# Patient Record
Sex: MALE | Race: BLACK OR AFRICAN AMERICAN | Employment: STUDENT | URBAN - METROPOLITAN AREA
[De-identification: names, ages, dates, MRNs, and addresses within clinical notes are randomized per-mention and may not be internally consistent; named-entity substitution may affect disease eponyms.]

---
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
## 2021-01-14 ENCOUNTER — LAB SERVICES (OUTPATIENT)
Dept: OCCUPATIONAL MEDICINE | Age: 21
End: 2021-01-14

## 2021-01-14 DIAGNOSIS — Z20.822 EXPOSURE TO COVID-19 VIRUS: Primary | ICD-10-CM

## 2021-01-14 PROCEDURE — U0005 INFEC AGEN DETEC AMPLI PROBE: HCPCS | Performed by: HOSPITALIST

## 2021-01-14 PROCEDURE — U0003 INFECTIOUS AGENT DETECTION BY NUCLEIC ACID (DNA OR RNA); SEVERE ACUTE RESPIRATORY SYNDROME CORONAVIRUS 2 (SARS-COV-2) (CORONAVIRUS DISEASE [COVID-19]), AMPLIFIED PROBE TECHNIQUE, MAKING USE OF HIGH THROUGHPUT TECHNOLOGIES AS DESCRIBED BY CMS-2020-01-R: HCPCS | Performed by: HOSPITALIST

## 2021-01-15 LAB
SARS-COV-2 RNA RESP QL NAA+PROBE: NOT DETECTED
SERVICE CMNT-IMP: NORMAL
SERVICE CMNT-IMP: NORMAL

## 2021-03-03 ENCOUNTER — HOSPITAL ENCOUNTER (OUTPATIENT)
Age: 21
Discharge: HOME | End: 2021-03-03
Payer: COMMERCIAL

## 2021-03-03 VITALS
HEART RATE: 60 BPM | SYSTOLIC BLOOD PRESSURE: 125 MMHG | DIASTOLIC BLOOD PRESSURE: 70 MMHG | OXYGEN SATURATION: 100 % | TEMPERATURE: 98 F | RESPIRATION RATE: 18 BRPM

## 2021-03-03 DIAGNOSIS — H57.89 EYE IRRITATION: Primary | ICD-10-CM

## 2021-03-03 PROCEDURE — 99203 OFFICE O/P NEW LOW 30 MIN: CPT | Performed by: NURSE PRACTITIONER

## 2021-03-04 NOTE — ED PROVIDER NOTES
Patient Seen in: [facility]      History   Patient presents with:  Redness    Stated Complaint: Jerk sauce in R eye    HPI/Subjective:   HPI    This is a well-appearing 26-year-old who presents with a chief complaint of right eye irritation. Mouth/Throat:      Mouth: Mucous membranes are moist.      Pharynx: Oropharynx is clear. Uvula midline. Eyes:      General: Lids are normal. Lids are everted, no foreign bodies appreciated. Extraocular Movements: Extraocular movements intact. [id number]  [phone]              Medications Prescribed:  There are no discharge medications for this patient.

## 2021-03-04 NOTE — ED INITIAL ASSESSMENT (HPI)
Pt states getting jerk sauce in his eye about an hour ago. Pt states flushed it as much as he could.

## 2021-03-21 ENCOUNTER — HOSPITAL ENCOUNTER (OUTPATIENT)
Age: 21
Discharge: HOME | End: 2021-03-21
Payer: COMMERCIAL

## 2021-03-21 VITALS
HEART RATE: 50 BPM | DIASTOLIC BLOOD PRESSURE: 67 MMHG | OXYGEN SATURATION: 100 % | SYSTOLIC BLOOD PRESSURE: 130 MMHG | TEMPERATURE: 98 F | RESPIRATION RATE: 20 BRPM

## 2021-03-21 DIAGNOSIS — Z20.822 EXPOSURE TO COVID-19 VIRUS: Primary | ICD-10-CM

## 2021-03-21 DIAGNOSIS — Z20.822 LAB TEST NEGATIVE FOR COVID-19 VIRUS: ICD-10-CM

## 2021-03-21 LAB — SARS-COV-2 RNA RESP QL NAA+PROBE: NOT DETECTED

## 2021-03-21 PROCEDURE — U0002 COVID-19 LAB TEST NON-CDC: HCPCS | Performed by: NURSE PRACTITIONER

## 2021-03-21 PROCEDURE — 99212 OFFICE O/P EST SF 10 MIN: CPT | Performed by: NURSE PRACTITIONER

## 2021-03-21 NOTE — ED PROVIDER NOTES
Patient presents with:  Testing      HPI:     Jena Severino. is a 21year old male who presents for a Covid test.  His grandfather tested positive yesterday. He was in contact with him for the past couple days.   He denies any symptoms or co Transportation (Medical):       Lack of Transportation (Non-Medical):   Physical Activity:       Days of Exercise per Week:       Minutes of Exercise per Session:   Stress:       Feeling of Stress :   Social Connections:       Frequency of Communication wi with his primary care doctor. Diagnosis:    ICD-10-CM    1.  Exposure to COVID-19 virus  Z20.822 RAPID SARS-COV-2 BY PCR     RAPID SARS-COV-2 BY PCR   2. Lab test negative for COVID-19 virus  Z03.818        All results reviewed and discussed with patient

## 2021-03-25 ENCOUNTER — LAB SERVICES (OUTPATIENT)
Dept: OCCUPATIONAL MEDICINE | Age: 21
End: 2021-03-25

## 2021-03-25 DIAGNOSIS — Z20.822 SUSPECTED COVID-19 VIRUS INFECTION: Primary | ICD-10-CM

## 2021-03-25 PROCEDURE — U0005 INFEC AGEN DETEC AMPLI PROBE: HCPCS | Performed by: HOSPITALIST

## 2021-03-25 PROCEDURE — U0003 INFECTIOUS AGENT DETECTION BY NUCLEIC ACID (DNA OR RNA); SEVERE ACUTE RESPIRATORY SYNDROME CORONAVIRUS 2 (SARS-COV-2) (CORONAVIRUS DISEASE [COVID-19]), AMPLIFIED PROBE TECHNIQUE, MAKING USE OF HIGH THROUGHPUT TECHNOLOGIES AS DESCRIBED BY CMS-2020-01-R: HCPCS | Performed by: HOSPITALIST

## 2021-03-26 LAB
SARS-COV-2 RNA RESP QL NAA+PROBE: NOT DETECTED
SERVICE CMNT-IMP: NORMAL
SERVICE CMNT-IMP: NORMAL

## 2021-11-29 ENCOUNTER — HOSPITAL ENCOUNTER (EMERGENCY)
Facility: HOSPITAL | Age: 21
Discharge: HOME | End: 2021-11-30
Attending: EMERGENCY MEDICINE
Payer: COMMERCIAL

## 2021-11-29 ENCOUNTER — HOSPITAL ENCOUNTER (OUTPATIENT)
Age: 21
Discharge: ANOTHER HOSPITAL | End: 2021-11-29
Payer: COMMERCIAL

## 2021-11-29 VITALS
HEART RATE: 85 BPM | OXYGEN SATURATION: 100 % | TEMPERATURE: 102 F | SYSTOLIC BLOOD PRESSURE: 118 MMHG | DIASTOLIC BLOOD PRESSURE: 75 MMHG | RESPIRATION RATE: 18 BRPM

## 2021-11-29 DIAGNOSIS — Z11.52 ENCOUNTER FOR SCREENING FOR COVID-19: Primary | ICD-10-CM

## 2021-11-29 DIAGNOSIS — B34.9 VIRAL SYNDROME: Primary | ICD-10-CM

## 2021-11-29 DIAGNOSIS — R50.9 FEVER, UNSPECIFIED FEVER CAUSE: ICD-10-CM

## 2021-11-29 DIAGNOSIS — R51.9 INTRACTABLE HEADACHE, UNSPECIFIED CHRONICITY PATTERN, UNSPECIFIED HEADACHE TYPE: ICD-10-CM

## 2021-11-29 PROCEDURE — 87880 STREP A ASSAY W/OPTIC: CPT | Performed by: NURSE PRACTITIONER

## 2021-11-29 PROCEDURE — 99284 EMERGENCY DEPT VISIT MOD MDM: CPT

## 2021-11-29 PROCEDURE — 85025 COMPLETE CBC W/AUTO DIFF WBC: CPT | Performed by: EMERGENCY MEDICINE

## 2021-11-29 PROCEDURE — 87502 INFLUENZA DNA AMP PROBE: CPT | Performed by: NURSE PRACTITIONER

## 2021-11-29 PROCEDURE — 99215 OFFICE O/P EST HI 40 MIN: CPT | Performed by: NURSE PRACTITIONER

## 2021-11-29 PROCEDURE — A9150 MISC/EXPER NON-PRESCRIPT DRU: HCPCS | Performed by: NURSE PRACTITIONER

## 2021-11-29 PROCEDURE — 80048 BASIC METABOLIC PNL TOTAL CA: CPT | Performed by: EMERGENCY MEDICINE

## 2021-11-29 PROCEDURE — 96360 HYDRATION IV INFUSION INIT: CPT

## 2021-11-29 PROCEDURE — U0002 COVID-19 LAB TEST NON-CDC: HCPCS | Performed by: NURSE PRACTITIONER

## 2021-11-29 RX ORDER — ACETAMINOPHEN 500 MG
1000 TABLET ORAL ONCE
Status: COMPLETED | OUTPATIENT
Start: 2021-11-29 | End: 2021-11-29

## 2021-11-30 VITALS
HEART RATE: 66 BPM | DIASTOLIC BLOOD PRESSURE: 67 MMHG | BODY MASS INDEX: 19.7 KG/M2 | RESPIRATION RATE: 16 BRPM | OXYGEN SATURATION: 100 % | TEMPERATURE: 100 F | WEIGHT: 130 LBS | SYSTOLIC BLOOD PRESSURE: 120 MMHG | HEIGHT: 68 IN

## 2021-11-30 NOTE — ED QUICK NOTES
Per provider recommendation pt will go to [facility] ED for further evaluation of symptoms,leaving IC stable no acute distress noted.

## 2021-11-30 NOTE — ED PROVIDER NOTES
Patient Seen in: [facility] Emergency Department      History   Patient presents with:  Fever    Stated Complaint: fever;headache    Subjective:   HPI    Patient is a 20-year-old male who presents with headache that started around midnight last ni retractions  Ab: soft, nontender, no distension  Extremities: FROM of all extremities, no cyanosis/clubbing/edema  Neuro: CN intact, normal speech, normal gait, 5/5 motor strength in all extremities, no focal deficits  SKIN: warm, dry, no rashes        ED 11:59 pm    Follow-up:  Christine Neff DO  [address] [id number] [phone]    In 2 days            Medications Prescribed:  There are no discharge medications for this patient.

## 2021-11-30 NOTE — ED PROVIDER NOTES
Patient Seen in: [facility]      History   No chief complaint on file. Stated Complaint: Headache    Subjective:   79-year-old male to immediate care today for complaint of sudden onset headache at midnight.   Describes it as a flash type Mucous membranes are dry. Pharynx: Posterior oropharyngeal erythema present. Eyes:      Pupils: Pupils are equal, round, and reactive to light.    Neck:      Comments: Chin to chest without difficulty  Cardiovascular:      Rate and Rhythm: Normal rat

## 2021-11-30 NOTE — ED INITIAL ASSESSMENT (HPI)
Pt states having headaches, body aches, lack of appetite, pt states symptoms began today. Pt denies sore throat or ear pain.

## 2021-11-30 NOTE — ED INITIAL ASSESSMENT (HPI)
Pt reports having a headache, lightheadedness, fatigue, and body aches since last night. Pt was advised to come here by urgent care to r/o meningitis. Pt states his headache and body aches have resolved at this time but states he still has a fever.  Pt ji

## 2021-12-19 ENCOUNTER — HOSPITAL ENCOUNTER (OUTPATIENT)
Age: 21
Discharge: HOME | End: 2021-12-19
Payer: COMMERCIAL

## 2021-12-19 VITALS
TEMPERATURE: 99 F | OXYGEN SATURATION: 100 % | HEART RATE: 62 BPM | DIASTOLIC BLOOD PRESSURE: 74 MMHG | RESPIRATION RATE: 18 BRPM | SYSTOLIC BLOOD PRESSURE: 125 MMHG

## 2021-12-19 DIAGNOSIS — Z11.52 ENCOUNTER FOR SCREENING FOR COVID-19: Primary | ICD-10-CM

## 2021-12-19 PROCEDURE — U0002 COVID-19 LAB TEST NON-CDC: HCPCS | Performed by: NURSE PRACTITIONER

## 2021-12-19 PROCEDURE — 99212 OFFICE O/P EST SF 10 MIN: CPT | Performed by: NURSE PRACTITIONER

## 2021-12-19 NOTE — ED PROVIDER NOTES
Patient presents with:  Testing      HPI:     Killen Odor. is a 24year old male who presents for a Covid test.  He states he recently had Covid and needs a test prior to returning to work. He denies any symptoms or complaints.   He appear file  Social Connections: Not on file  Intimate Partner Violence: Not on file  Housing Stability: Not on file     ROS:   Positive for stated complaint: COVID test  All other systems reviewed and negative except as noted above.   Constitutional and Vital Sig

## 2023-12-07 ENCOUNTER — OFFICE VISIT (OUTPATIENT)
Dept: FAMILY MEDICINE CLINIC | Facility: CLINIC | Age: 23
End: 2023-12-07

## 2023-12-07 ENCOUNTER — LAB ENCOUNTER (OUTPATIENT)
Dept: LAB | Age: 23
End: 2023-12-07
Attending: FAMILY MEDICINE
Payer: COMMERCIAL

## 2023-12-07 VITALS
BODY MASS INDEX: 19.85 KG/M2 | DIASTOLIC BLOOD PRESSURE: 82 MMHG | HEIGHT: 68 IN | SYSTOLIC BLOOD PRESSURE: 124 MMHG | HEART RATE: 64 BPM | OXYGEN SATURATION: 98 % | WEIGHT: 131 LBS

## 2023-12-07 DIAGNOSIS — Z00.00 ROUTINE PHYSICAL EXAMINATION: ICD-10-CM

## 2023-12-07 DIAGNOSIS — Z00.00 ROUTINE PHYSICAL EXAMINATION: Primary | ICD-10-CM

## 2023-12-07 LAB
HAV AB SER QL IA: NONREACTIVE
HBV CORE AB SERPL QL IA: NONREACTIVE
HBV SURFACE AB SER QL: NONREACTIVE
HBV SURFACE AB SERPL IA-ACNC: <3.1 MIU/ML
HBV SURFACE AG SERPL QL IA: NONREACTIVE
HCV AB SERPL QL IA: NONREACTIVE

## 2023-12-07 PROCEDURE — 87340 HEPATITIS B SURFACE AG IA: CPT

## 2023-12-07 PROCEDURE — 86706 HEP B SURFACE ANTIBODY: CPT

## 2023-12-07 PROCEDURE — 3074F SYST BP LT 130 MM HG: CPT | Performed by: FAMILY MEDICINE

## 2023-12-07 PROCEDURE — 80503 PATH CLIN CONSLTJ SF 5-20: CPT

## 2023-12-07 PROCEDURE — 87389 HIV-1 AG W/HIV-1&-2 AB AG IA: CPT

## 2023-12-07 PROCEDURE — 86704 HEP B CORE ANTIBODY TOTAL: CPT

## 2023-12-07 PROCEDURE — 3008F BODY MASS INDEX DOCD: CPT | Performed by: FAMILY MEDICINE

## 2023-12-07 PROCEDURE — 86803 HEPATITIS C AB TEST: CPT

## 2023-12-07 PROCEDURE — 36415 COLL VENOUS BLD VENIPUNCTURE: CPT

## 2023-12-07 PROCEDURE — 99385 PREV VISIT NEW AGE 18-39: CPT | Performed by: FAMILY MEDICINE

## 2023-12-07 PROCEDURE — 3079F DIAST BP 80-89 MM HG: CPT | Performed by: FAMILY MEDICINE

## 2023-12-07 PROCEDURE — 86708 HEPATITIS A ANTIBODY: CPT

## 2024-02-19 ENCOUNTER — HOSPITAL ENCOUNTER (EMERGENCY)
Facility: HOSPITAL | Age: 24
Discharge: HOME | End: 2024-02-20
Attending: EMERGENCY MEDICINE
Payer: COMMERCIAL

## 2024-02-19 VITALS
HEART RATE: 59 BPM | TEMPERATURE: 98 F | SYSTOLIC BLOOD PRESSURE: 117 MMHG | HEIGHT: 68 IN | BODY MASS INDEX: 20 KG/M2 | OXYGEN SATURATION: 99 % | DIASTOLIC BLOOD PRESSURE: 77 MMHG | WEIGHT: 132 LBS | RESPIRATION RATE: 18 BRPM

## 2024-02-19 DIAGNOSIS — L50.9 URTICARIA: Primary | ICD-10-CM

## 2024-02-19 PROCEDURE — 99283 EMERGENCY DEPT VISIT LOW MDM: CPT

## 2024-02-19 RX ORDER — DIPHENHYDRAMINE HCL 25 MG
50 CAPSULE ORAL ONCE
Status: COMPLETED | OUTPATIENT
Start: 2024-02-19 | End: 2024-02-19

## 2024-02-19 RX ORDER — MONTELUKAST SODIUM 10 MG/1
10 TABLET ORAL NIGHTLY
Qty: 10 TABLET | Refills: 0 | Status: SHIPPED | OUTPATIENT
Start: 2024-02-19 | End: 2024-02-29

## 2024-02-19 RX ORDER — PREDNISONE 20 MG/1
60 TABLET ORAL ONCE
Status: COMPLETED | OUTPATIENT
Start: 2024-02-19 | End: 2024-02-19

## 2024-02-19 RX ORDER — PREDNISONE 20 MG/1
40 TABLET ORAL DAILY
Qty: 10 TABLET | Refills: 0 | Status: SHIPPED | OUTPATIENT
Start: 2024-02-19 | End: 2024-02-24

## 2024-02-20 NOTE — ED INITIAL ASSESSMENT (HPI)
Patient arrives complaining of hives all over his body. +itchiness. Patient speaking in full clear sentences. No respiratory distress noted. No medication PTA. No known allergies. Patient recently sick.

## 2024-02-20 NOTE — ED PROVIDER NOTES
Chief Complaint   Patient presents with    Allergic Rxn Allergies     Stated Complaint: Hives    HPI  Patient complains of skin rash after eating shrimp fried rice, on arms, back, torso.  No swelling of lips or tongue    Past Medical History:   Diagnosis Date    Respiratory infection 2002    Hospitalized, did NOT hav RSV       History reviewed. No pertinent surgical history.         Family History   Problem Relation Age of Onset    Asthma Mother     Diabetes Maternal Grandmother     Heart Disease Paternal Grandfather     Heart Disease Paternal Uncle     Diabetes Other     Heart Disorder Other        Social History     Socioeconomic History    Marital status: Single   Tobacco Use    Smoking status: Never    Smokeless tobacco: Never   Vaping Use    Vaping Use: Never used   Substance and Sexual Activity    Alcohol use: No    Drug use: Yes     Frequency: 7.0 times per week     Types: Cannabis     Comment: occasionally   Other Topics Concern    Caffeine Concern Yes     Comment: Soda       Review of Systems    Positive for stated complaint: Hives  Other systems are as noted in HPI.  Constitutional and vital signs reviewed.      All other systems reviewed and negative except as noted above.    PSFH elements reviewed from today and agreed except as otherwise stated in HPI.    Physical Exam     ED Triage Vitals   BP 02/19/24 2233 125/53   Pulse 02/19/24 2233 60   Resp 02/19/24 2233 18   Temp 02/19/24 2234 97.6 °F (36.4 °C)   Temp src --    SpO2 02/19/24 2233 98 %   O2 Device --        Current:/53   Pulse 60   Temp 97.6 °F (36.4 °C)   Resp 18   Ht 172.7 cm (5' 8\")   Wt 59.9 kg   SpO2 98%   BMI 20.07 kg/m²       GENERAL: awake alert no distress  HEENT: EOMI, MMM no lesions  EXTREMITIES: from 5/5 in all 4  NEURO: alert, oriented x 3, 2-12 intact, no focal deficits appreciated  SKIN:  urticarial rash torso, back  PSYCH: calm, cooperative,          ED Course   Labs Reviewed - No data to display    [facility]       Medical  Decision Making  Problems Addressed:  Urticaria: acute illness or injury     Details: Steroids, antihistamines    Risk  OTC drugs.  Prescription drug management.                    Disposition and Plan     Clinical Impression:  1. Urticaria        Disposition:  There is no disposition on file for this visit.    Follow-up:  Delfino Slade MD  [address] [place] [id number]  [phone]    Follow up        Medications Prescribed:  Current Discharge Medication List        START taking these medications    Details   predniSONE 20 MG Oral Tab Take 2 tablets (40 mg total) by mouth daily for 5 days.  Qty: 10 tablet, Refills: 0      montelukast 10 MG Oral Tab Take 1 tablet (10 mg total) by mouth nightly for 10 days.  Qty: 10 tablet, Refills: 0

## 2024-02-20 NOTE — ED QUICK NOTES
Per MD patient ok to discharge. Discharge instructions/medications reviewed with patient. Patient verbalizes understanding. Patient in NAD, ABCs intact. Patient stable and ambulatory at discharge. Patient left department with all belongings.

## 2024-03-06 ENCOUNTER — HOSPITAL ENCOUNTER (OUTPATIENT)
Age: 24
Discharge: HOME | End: 2024-03-06
Payer: COMMERCIAL

## 2024-03-06 VITALS
TEMPERATURE: 98 F | RESPIRATION RATE: 20 BRPM | DIASTOLIC BLOOD PRESSURE: 86 MMHG | SYSTOLIC BLOOD PRESSURE: 128 MMHG | HEART RATE: 85 BPM | OXYGEN SATURATION: 100 %

## 2024-03-06 DIAGNOSIS — R30.0 DYSURIA: Primary | ICD-10-CM

## 2024-03-06 DIAGNOSIS — Z11.3 ENCOUNTER FOR SCREENING EXAMINATION FOR SEXUALLY TRANSMITTED INFECTION: ICD-10-CM

## 2024-03-06 DIAGNOSIS — R82.90 ABNORMAL URINE FINDINGS: ICD-10-CM

## 2024-03-06 LAB
BILIRUB UR QL STRIP: NEGATIVE
CLARITY UR: CLEAR
COLOR UR: YELLOW
GLUCOSE UR STRIP-MCNC: NEGATIVE MG/DL
HGB UR QL STRIP: NEGATIVE
KETONES UR STRIP-MCNC: NEGATIVE MG/DL
LEUKOCYTE ESTERASE UR QL STRIP: NEGATIVE
NITRITE UR QL STRIP: NEGATIVE
PH UR STRIP: 7.5 [PH]
SP GR UR STRIP: 1.01
UROBILINOGEN UR STRIP-ACNC: 2 MG/DL

## 2024-03-06 PROCEDURE — 87591 N.GONORRHOEAE DNA AMP PROB: CPT | Performed by: NURSE PRACTITIONER

## 2024-03-06 PROCEDURE — 87086 URINE CULTURE/COLONY COUNT: CPT | Performed by: NURSE PRACTITIONER

## 2024-03-06 PROCEDURE — 87491 CHLMYD TRACH DNA AMP PROBE: CPT | Performed by: NURSE PRACTITIONER

## 2024-03-06 PROCEDURE — 87661 TRICHOMONAS VAGINALIS AMPLIF: CPT | Performed by: NURSE PRACTITIONER

## 2024-03-06 NOTE — ED PROVIDER NOTES
Patient Seen in: [facility]      History     Chief Complaint   Patient presents with    Urinary Symptoms    Eval-G     Stated Complaint: STD Check    Subjective:   Well-appearing 23-year-old male with no significant past medical history presents with complaints of pain with urination since yesterday.  Patient communicates 1 episode of scant bloody discharge from his penis after voiding yesterday.  Patient denies abnormal penile discharge.  Patient denies testicular swelling or tenderness.  Patient denies a history of STIs.  Patient denies abdominal pain, lower back pain.          Objective:   No pertinent past medical history.            No pertinent past surgical history.              No pertinent social history.            Review of Systems    Positive for stated complaint: STD Check  Other systems are as noted in HPI.  Constitutional and vital signs reviewed.      All other systems reviewed and negative except as noted above.    Physical Exam     ED Triage Vitals [03/06/24 1650]   /86   Pulse 85   Resp 20   Temp 97.9 °F (36.6 °C)   Temp src Temporal   SpO2 100 %   O2 Device None (Room air)       Current:/86   Pulse 85   Temp 97.9 °F (36.6 °C) (Temporal)   Resp 20   SpO2 100%         Physical Exam  VS: Vital signs reviewed. 02 saturation within normal limits for this patient.    General: Patient is awake and alert, oriented to person, place and time. Pt appears non-toxic.     HEENT: Head is normocephalic, atraumatic.  Nonicteric sclera, no conjunctival injection. No facial droop or slurred speech. No oral lesions or pallor. Mucous membranes moist.     Neck: Supple. Normal ROM.    Abdomen: Soft, nontender, non-distended.  There is no right CVA tenderness.  There is no left CVA tenderness.    Back: Normal inspection, no tenderness.     Extremities: Capillary refill noted.     Skin: Warm, dry and normal in color.     Psychiatric: Normal affect, judgement normal, insight normal.     CNS:  Moves all 4 extremities. Interacts appropriately. No gait abnormality. Memory normal.        ED Course     Labs Reviewed   [facility] POCT URINALYSIS DIPSTICK - Abnormal; Notable for the following components:       Result Value    Protein urine Trace (*)     Urobilinogen urine 2.0 (*)     All other components within normal limits   TRICH VAG BY ROMEO   CHLAMYDIA/GONOCOCCUS, ROMEO   URINE CULTURE, ROUTINE       MDM   Medical Decision Making  Well-appearing.  UA was negative for leukocyte esterase, blood or nitrates.  GC/chlamydia culture pending.  Urine culture pending.  PMD follow-up as well as return precautions discussed.    Differential diagnosis considered included urethritis versus sexually transmitted infection versus urinary tract infection.    Problems Addressed:  Abnormal urine findings: acute illness or injury  Dysuria: acute illness or injury  Encounter for screening examination for sexually transmitted infection: acute illness or injury    Amount and/or Complexity of Data Reviewed  Labs: ordered. Decision-making details documented in ED Course.        Disposition and Plan     Clinical Impression:  1. Dysuria    2. Encounter for screening examination for sexually transmitted infection    3. Abnormal urine findings         Disposition:  Discharge  3/6/2024  5:45 pm    Follow-up:  Shayy Beckett MD  [address] [address]  [phone]    In 1 week            Medications Prescribed:  Discharge Medication List as of 3/6/2024  5:50 PM

## 2024-03-06 NOTE — ED INITIAL ASSESSMENT (HPI)
Pt here with concerns of a UTI or STDs, pt states he developed dysuria and bloody discharge from his penis that began 1 day ago, pt denies any fever or abd pain

## 2024-03-07 LAB
C TRACH DNA SPEC QL NAA+PROBE: NEGATIVE
N GONORRHOEA DNA SPEC QL NAA+PROBE: NEGATIVE

## 2024-03-08 ENCOUNTER — OFFICE VISIT (OUTPATIENT)
Dept: INTERNAL MEDICINE CLINIC | Facility: CLINIC | Age: 24
End: 2024-03-08

## 2024-03-08 ENCOUNTER — NURSE TRIAGE (OUTPATIENT)
Dept: FAMILY MEDICINE CLINIC | Facility: CLINIC | Age: 24
End: 2024-03-08

## 2024-03-08 VITALS
HEIGHT: 68 IN | BODY MASS INDEX: 20.16 KG/M2 | SYSTOLIC BLOOD PRESSURE: 112 MMHG | DIASTOLIC BLOOD PRESSURE: 64 MMHG | WEIGHT: 133 LBS | RESPIRATION RATE: 17 BRPM | OXYGEN SATURATION: 98 % | HEART RATE: 72 BPM | TEMPERATURE: 98 F

## 2024-03-08 DIAGNOSIS — R30.0 DYSURIA: Primary | ICD-10-CM

## 2024-03-08 LAB
APPEARANCE: CLEAR
BILIRUB UR QL: NEGATIVE
BILIRUBIN: NEGATIVE
CLARITY UR: CLEAR
GLUCOSE (URINE DIPSTICK): NEGATIVE MG/DL
GLUCOSE UR-MCNC: NORMAL MG/DL
HGB UR QL STRIP.AUTO: NEGATIVE
KETONES (URINE DIPSTICK): NEGATIVE MG/DL
KETONES UR-MCNC: NEGATIVE MG/DL
LEUKOCYTE ESTERASE UR QL STRIP.AUTO: NEGATIVE
LEUKOCYTES: NEGATIVE
MULTISTIX LOT#: ABNORMAL NUMERIC
NITRITE UR QL STRIP.AUTO: NEGATIVE
NITRITE, URINE: NEGATIVE
PH UR: 7 [PH] (ref 5–8)
PH, URINE: 7 (ref 4.5–8)
PROT UR-MCNC: NEGATIVE MG/DL
PROTEIN (URINE DIPSTICK): NEGATIVE MG/DL
SP GR UR STRIP: 1.03 (ref 1–1.03)
SPECIFIC GRAVITY: 1.02 (ref 1–1.03)
URINE-COLOR: YELLOW
UROBILINOGEN UR STRIP-ACNC: NORMAL
UROBILINOGEN,SEMI-QN: 1 MG/DL (ref 0–1.9)

## 2024-03-08 PROCEDURE — 99203 OFFICE O/P NEW LOW 30 MIN: CPT | Performed by: INTERNAL MEDICINE

## 2024-03-08 PROCEDURE — 3008F BODY MASS INDEX DOCD: CPT | Performed by: INTERNAL MEDICINE

## 2024-03-08 PROCEDURE — 3074F SYST BP LT 130 MM HG: CPT | Performed by: INTERNAL MEDICINE

## 2024-03-08 PROCEDURE — 3078F DIAST BP <80 MM HG: CPT | Performed by: INTERNAL MEDICINE

## 2024-03-08 PROCEDURE — 81003 URINALYSIS AUTO W/O SCOPE: CPT | Performed by: INTERNAL MEDICINE

## 2024-03-08 RX ORDER — CIPROFLOXACIN 250 MG/1
250 TABLET, FILM COATED ORAL 2 TIMES DAILY
Qty: 10 TABLET | Refills: 0 | Status: SHIPPED | OUTPATIENT
Start: 2024-03-08 | End: 2024-03-13

## 2024-03-08 NOTE — TELEPHONE ENCOUNTER
Action Requested: Summary for Provider     []  Critical Lab, Recommendations Needed  [] Need Additional Advice  []   FYI    []   Need Orders  [] Need Medications Sent to Pharmacy  []  Other     SUMMARY: Per protocol disposition advised see today in office. Appointment scheduled:  Future Appointments   Date Time Provider Department Center   3/8/2024 11:30 AM Sb Sosa MD [id number] [id number]   4/24/2024  2:30 PM Delfino Slade MD [id number]     Reason for call: Acute  Onset: 4 days ago    Reason for Call/Symptoms: Feels burning sensation at tip of penis, blood dripping from penis  Onset: 3-4 days ago  Reports he was in [place] last week  Had sexual intercourse over the weekend with girlfriend who reports she is monogamous with patient  When out of town, someone was supposed to take care of dog. When he came home, dog was covered in his own feces and urine. Patient did not wash him right away. Dog jumped on bed and patient kicked him off, but patient sleeps nude, feels symptoms may be related to exposure to dog urine/feces  Yesterday noticed some blood dripping down leg from tip of penis  Sharp pain in abdomen that happened this morning, lasted only a couple of seconds    Reason for Disposition   All other males with painful urination, or patient wants to be seen    Protocols used: Urination Pain - Male-A-OH

## 2024-03-08 NOTE — PROGRESS NOTES
Subjective:     Patient ID: Nigel Buitrago Jr. is a 23 year old male.    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         History/Other:   Review of Systems   Constitutional: Negative.    HENT: Negative.     Eyes: Negative.    Respiratory: Negative.     Cardiovascular: Negative.    Gastrointestinal: Negative.    Genitourinary:  Positive for difficulty urinating and dysuria.   Musculoskeletal: Negative.    Skin: Negative.    Neurological: Negative.    Psychiatric/Behavioral: Negative.       No current outpatient medications on file.     Allergies:No Known Allergies    Past Medical History:   Diagnosis Date    Respiratory infection 2002    Hospitalized, did NOT hav RSV      No past surgical history on file.   Family History   Problem Relation Age of Onset    Asthma Mother     Diabetes Maternal Grandmother     Heart Disease Paternal Grandfather     Heart Disease Paternal Uncle     Diabetes Other     Heart Disorder Other       Social History:   Social History     Socioeconomic History    Marital status: Single   Tobacco Use    Smoking status: Never    Smokeless tobacco: Never   Vaping Use    Vaping Use: Never used   Substance and Sexual Activity    Alcohol use: Not Currently    Drug use: Yes     Frequency: 7.0 times per week     Types: Cannabis     Comment: occasionally   Other Topics Concern    Caffeine Concern Yes     Comment: Soda        Objective:   Physical Exam  Vitals and nursing note reviewed.   Constitutional:       Appearance: He is well-developed.   HENT:      Head: Normocephalic and atraumatic.      Right Ear: External ear normal.      Left Ear: External ear  normal.      Nose: Nose normal.   Eyes:      Conjunctiva/sclera: Conjunctivae normal.      Pupils: Pupils are equal, round, and reactive to light.   Cardiovascular:      Rate and Rhythm: Normal rate and regular rhythm.      Heart sounds: Normal heart sounds.   Pulmonary:      Effort: Pulmonary effort is normal.      Breath sounds: Normal breath sounds.   Abdominal:      General: Bowel sounds are normal.      Palpations: Abdomen is soft.   Genitourinary:     Penis: Normal.       Prostate: Normal.      Rectum: Normal.      Comments: Some redness to tip of urethra, no lesions   Musculoskeletal:         General: Normal range of motion.      Cervical back: Normal range of motion and neck supple.   Skin:     General: Skin is warm and dry.   Neurological:      Mental Status: He is alert and oriented to person, place, and time.      Deep Tendon Reflexes: Reflexes are normal and symmetric.         Assessment & Plan:   1. Dysuria we will send urine for culture we will treat with antibiotic in the meantime drink plenty of fluid Trichomonas test still pending cyst     will follow-up         Orders Placed This Encounter   Procedures    POC Urinalysis, Automated Dip without microscopy (PCA and EMMG ONLY) [05253]    UA/M WITH CULTURE REFLEX [3020]       Meds This Visit:  Requested Prescriptions      No prescriptions requested or ordered in this encounter       Imaging & Referrals:  None

## 2024-03-09 LAB — TRICH VAG NAA: NEGATIVE

## (undated) NOTE — LETTER
[address] [facility]   [address]  [phone]    08/02/17          Marquis Carreon, [address]          Patient: Lisa aCrballo   Date of Birth: [birth year]

## (undated) NOTE — LETTER
1/30/2018          To Whom It May Concern:    Chance Castillo is currently under my medical care and was seen in clinic for an acute medical visit. Please excuse his absence from 1/29/18 - 2/2/18, he is cleared to return on 2/5/18 with no restrictions.    I

## (undated) NOTE — LETTER
9/21/2017          To Whom It May Concern:    Sarwat Bhakta is currently under my medical care. Activity is restricted as follows: No physical education or contact sports for the next month. He may return to full activities on November 1, 2017.     If

## (undated) NOTE — ED AVS SNAPSHOT
Annalisa Garsia   MRN: [id number]    Department:  [facility] Emergency Department   Date of Visit:  7/28/2017           Disclosure     Insurance plans vary and the physician(s) referred by the ER may not be covered by your plan.  Please contact CARE PHYSICIAN AT ONCE OR RETURN IMMEDIATELY TO THE EMERGENCY DEPARTMENT. If you have been prescribed any medication(s), please fill your prescription right away and begin taking the medication(s) as directed.   If you believe that any of the medications

## (undated) NOTE — LETTER
Name:  Arcadio Leonard Year:  12th Grade Class: Student ID No.:   Address:  [place] [address] Phone:  [phone] (home)  : [birth year]/ 16year old   Name Relationship [address] Work Phone Home Phone Mobile Phone   1.  Zackary Muse 12. Has anyone in your family had unexplained fainting, seizures, or near drowning? BONE AND JOINT QUESTIONS Yes No   17. Have you ever had an injury to a bone, muscle, ligament, or tendon that caused you to miss a practice or a game?      18. Have you /fall?     36. Have you ever become ill while exercising in the heat?     41. Do you get frequent muscle cramps when exercising? 42. Do you or someone in your family have sickle cell trait or disease? 43.  Have you ever had any problems with your ey · Murmurs (auscultation standing, supine, +/- Valsalva)  · Location of point of maximal impulse (PMI) Yes    Pulses Yes    Lungs Yes    Abdomen Yes    Genitourinary (males only)* N/A    Skin:  HSV, lesions suggestive of MRSA, tinea corporis Yes    Neurolog state series events or during the school day, and I/our student do/does hereby agree to submit to such testing and analysis by a certified laboratory.  We further understand and agree that the results of the performance-enhancing substance testing may be pr

## (undated) NOTE — LETTER
8/21/2017          To Whom It May Concern:    Yvette Santoyo is currently under my medical care and may return to school at this time. Please excuse Johnson Fairbanks for 8 weeks from the following:  No physical education, no sports.     If you require additional